# Patient Record
Sex: FEMALE | Race: WHITE | Employment: OTHER | URBAN - METROPOLITAN AREA
[De-identification: names, ages, dates, MRNs, and addresses within clinical notes are randomized per-mention and may not be internally consistent; named-entity substitution may affect disease eponyms.]

---

## 2018-05-30 ENCOUNTER — HOSPITAL ENCOUNTER (OUTPATIENT)
Dept: CT IMAGING | Age: 53
Discharge: HOME | End: 2018-05-30
Payer: COMMERCIAL

## 2018-05-30 ENCOUNTER — HOSPITAL ENCOUNTER (OUTPATIENT)
Dept: INTERVENTIONAL RADIOLOGY/VASCULAR | Age: 53
Discharge: HOME | End: 2018-05-30
Payer: COMMERCIAL

## 2018-05-30 VITALS
BODY MASS INDEX: 30.12 KG/M2 | WEIGHT: 170 LBS | HEIGHT: 63 IN | TEMPERATURE: 98 F | RESPIRATION RATE: 18 BRPM | DIASTOLIC BLOOD PRESSURE: 92 MMHG | OXYGEN SATURATION: 95 % | SYSTOLIC BLOOD PRESSURE: 130 MMHG | HEART RATE: 94 BPM

## 2018-05-30 DIAGNOSIS — M54.50 LOW BACK PAIN: ICD-10-CM

## 2018-05-30 PROCEDURE — 77030014143 HC TY PUNC LUMBR BD -A

## 2018-05-30 PROCEDURE — 72132 CT LUMBAR SPINE W/DYE: CPT

## 2018-05-30 PROCEDURE — 62304 MYELOGRAPHY LUMBAR INJECTION: CPT

## 2018-05-30 PROCEDURE — 74011636320 HC RX REV CODE- 636/320: Performed by: PHYSICIAN ASSISTANT

## 2018-05-30 PROCEDURE — 74011000250 HC RX REV CODE- 250: Performed by: PHYSICIAN ASSISTANT

## 2018-05-30 PROCEDURE — 77030003666 HC NDL SPINAL BD -A

## 2018-05-30 RX ORDER — LIDOCAINE HYDROCHLORIDE 10 MG/ML
1-20 INJECTION, SOLUTION EPIDURAL; INFILTRATION; INTRACAUDAL; PERINEURAL
Status: DISCONTINUED | OUTPATIENT
Start: 2018-05-30 | End: 2018-05-30 | Stop reason: ALTCHOICE

## 2018-05-30 RX ADMIN — IOPAMIDOL 15 ML: 408 INJECTION, SOLUTION INTRATHECAL at 08:39

## 2018-05-30 RX ADMIN — LIDOCAINE HYDROCHLORIDE 8 ML: 10 INJECTION, SOLUTION EPIDURAL; INFILTRATION; INTRACAUDAL; PERINEURAL at 08:36

## 2018-05-30 RX ADMIN — SODIUM BICARBONATE 2 ML: 0.2 INJECTION, SOLUTION INTRAVENOUS at 08:36

## 2018-05-30 NOTE — DISCHARGE INSTRUCTIONS
[address]  Department of Interventional Radiology  [phone] Office  [phone] Fax  MYELOGRAM  General Information:    Myelogram:     A Myelogram involves a lumbar puncture, and instead of removing fluid, contrast will be injected into the sac surrounding the spinal column. It is done to visualize the spinal column, nerve roots, spinal canal, vertebral discs and disc space. It is usually done to diagnose back pain with unknown cause or in preparation for surgery. After the injection, a CT scan will be done, usually within two hours of the injection. Call If:     You should call your Physician and/or the Radiology Nurse if you develop a headache that is not relieved by Tylenol, and worsens when you stand and eases when you lie down, you need to call. You may have developed what is referred to as a spinal headache. Our physician's will probably advise you to be on strict bed rest for 24 hours, to drink lots of fluids and caffeine. If this does not help the head pain, call again the next day. You should call if you have bleeding other than a small spot on your bandage. You should call if you have any numbness, tingling, weakness, fever, chills, urinary retention, severe itching, rash, welts, swelling, or confusion. Follow-Up Instructions: See the doctor who ordered your procedure as he/she has instructed. If you had a Lumbar Puncture or Myelogram, your results should be available to your ordering doctor in 3-5 business days. You can remove your dressing in 24 hours and shower regularly. Do not bathe or swim for 72 hours. To Reach Us: If you have any questions about your procedure, please call the Interventional Radiology department at [phone]. After business hours (5pm) and weekends, call the answering service at [phone] and ask for the Radiologist on call to be paged.        Interventional Radiology General Nurse Discharge    After general anesthesia or intravenous sedation, for 24 hours or while taking prescription Narcotics:  · Limit your activities  · Do not drive and operate hazardous machinery  · Do not make important personal or business decisions  · Do  not drink alcoholic beverages  · If you have not urinated within 8 hours after discharge, please contact your surgeon on call. * Please give a list of your current medications to your Primary Care Provider. * Please update this list whenever your medications are discontinued, doses are     changed, or new medications (including over-the-counter products) are added. * Please carry medication information at all times in case of emergency situations. These are general instructions for a healthy lifestyle:    No smoking/ No tobacco products/ Avoid exposure to second hand smoke  Surgeon General's Warning:  Quitting smoking now greatly reduces serious risk to your health. Obesity, smoking, and sedentary lifestyle greatly increases your risk for illness  A healthy diet, regular physical exercise & weight monitoring are important for maintaining a healthy lifestyle    You may be retaining fluid if you have a history of heart failure or if you experience any of the following symptoms:  Weight gain of 3 pounds or more overnight or 5 pounds in a week, increased swelling in our hands or feet or shortness of breath while lying flat in bed. Please call your doctor as soon as you notice any of these symptoms; do not wait until your next office visit. Recognize signs and symptoms of STROKE:  F-face looks uneven    A-arms unable to move or move unevenly    S-speech slurred or non-existent    T-time-call 911 as soon as signs and symptoms begin-DO NOT go       Back to bed or wait to see if you get better-TIME IS BRAIN. Patient Signature:  Date: 5/30/2018  Discharging Nurse:  Janet Mason RN

## 2018-05-30 NOTE — IP AVS SNAPSHOT
[address] 
 
 
 [address] 
[phone] Patient: Thor Whitmore MRN: [id number] :[id number] About your hospitalization You were admitted on:  May 30, 2018 You last received care in the:  [facility] Radiology Specials You were discharged on:  May 30, 2018 Why you were hospitalized Your primary diagnosis was:  Not on File Follow-up Information None Your Scheduled Appointments Wednesday May 30, 2018  9:30 AM EDT  
CT POST MYELO with [facility] CT 59 SLICE UNIT 1  
[facility] RADIOLOGY CT SCAN ([facility]) [address] [facility] [address] [place] [address]  
[phone]  
  
   
 2nd Part of Interventional Radiology procedure. Scan completed in CT department. Referring Physicians: 1) Fax H&P/recent office notes and order to Interventional Radiology. Lab work not typically required unless Pt condition dictates. 2)Patients with contrast dye allergies must be pre medicated prior to arrival. 3) Obtain clearance to hold blood thinners from prescribing Physician and give Patient instructions prior to arrival. 4) Continue medications and arrive well hydrated. 6) Pt to arrive 45 minutes early. This is a non sedation procedure. 7) Responsible adult  required to drive Patient home after recovery period. Recovery period can vary depending on patient condition. 8) Interventional Radiology Scheduling can be contacted at [address], [address] [place] [address]- 2nd floor of Outpatient Medical Office Building Discharge Orders None A check ashanti indicates which time of day the medication should be taken. My Medications ASK your doctor about these medications Instructions Each Dose to Equal  
 Morning Noon Evening Bedtime  
 aspirin 81 mg chewable tablet Your last dose was: Your next dose is: Take 81 mg by mouth daily.   
 81 mg  
    
   
 BENTYL 10 mg capsule Generic drug:  dicyclomine Your last dose was: Your next dose is: Take 10 mg by mouth daily as needed. 10 mg  
    
   
   
   
  
 carvedilol 3.125 mg tablet Commonly known as:  Mauricio Doshi Your last dose was: Your next dose is: Take  by mouth two (2) times daily (with meals). CHANTIX CONTINUING MONTH BONITA 1 mg tablet Generic drug:  varenicline Your last dose was: Your next dose is: Take 1 mg by mouth two (2) times daily (after meals). 1 mg CRESTOR 20 mg tablet Generic drug:  rosuvastatin Your last dose was: Your next dose is: Take 20 mg by mouth nightly. 20 mg  
    
   
   
   
  
 isosorbide mononitrate ER 60 mg CR tablet Commonly known as:  IMDUR Your last dose was: Your next dose is: Take 1 Tab by mouth every morning. 60 mg NITROSTAT 0.4 mg SL tablet Generic drug:  nitroglycerin Your last dose was: Your next dose is:    
   
   
 by SubLINGual route every five (5) minutes as needed for Chest Pain. PEPCID 40 mg tablet Generic drug:  famotidine Your last dose was: Your next dose is: Take 40 mg by mouth daily. 40 mg  
    
   
   
   
  
 PLAVIX 75 mg Tab Generic drug:  clopidogrel Your last dose was: Your next dose is: Take  by mouth daily. PROTONIX 40 mg tablet Generic drug:  pantoprazole Your last dose was: Your next dose is: Take 40 mg by mouth daily. 40 mg  
    
   
   
   
  
 RANEXA 500 mg SR tablet Generic drug:  ranolazine ER Your last dose was: Your next dose is: Take  by mouth two (2) times a day. Discharge Instructions [address] Department of Interventional Radiology 
[phone] Office 
[phone] Fax MYELOGRAM 
General Information: Myelogram: A Myelogram involves a lumbar puncture, and instead of removing fluid, contrast will be injected into the sac surrounding the spinal column. It is done to visualize the spinal column, nerve roots, spinal canal, vertebral discs and disc space. It is usually done to diagnose back pain with unknown cause or in preparation for surgery. After the injection, a CT scan will be done, usually within two hours of the injection. Call If: 
   You should call your Physician and/or the Radiology Nurse if you develop a headache that is not relieved by Tylenol, and worsens when you stand and eases when you lie down, you need to call. You may have developed what is referred to as a spinal headache. Our physician's will probably advise you to be on strict bed rest for 24 hours, to drink lots of fluids and caffeine. If this does not help the head pain, call again the next day. You should call if you have bleeding other than a small spot on your bandage. You should call if you have any numbness, tingling, weakness, fever, chills, urinary retention, severe itching, rash, welts, swelling, or confusion. Follow-Up Instructions: See the doctor who ordered your procedure as he/she has instructed. If you had a Lumbar Puncture or Myelogram, your results should be available to your ordering doctor in 3-5 business days. You can remove your dressing in 24 hours and shower regularly. Do not bathe or swim for 72 hours. To Reach Us: If you have any questions about your procedure, please call the Interventional Radiology department at [phone]. After business hours (5pm) and weekends, call the answering service at [phone] and ask for the Radiologist on call to be paged. Interventional Radiology General Nurse Discharge After general anesthesia or intravenous sedation, for 24 hours or while taking prescription Narcotics: · Limit your activities · Do not drive and operate hazardous machinery · Do not make important personal or business decisions · Do  not drink alcoholic beverages · If you have not urinated within 8 hours after discharge, please contact your surgeon on call. * Please give a list of your current medications to your Primary Care Provider. * Please update this list whenever your medications are discontinued, doses are 
   changed, or new medications (including over-the-counter products) are added. * Please carry medication information at all times in case of emergency situations. These are general instructions for a healthy lifestyle: No smoking/ No tobacco products/ Avoid exposure to second hand smoke Surgeon General's Warning:  Quitting smoking now greatly reduces serious risk to your health. Obesity, smoking, and sedentary lifestyle greatly increases your risk for illness A healthy diet, regular physical exercise & weight monitoring are important for maintaining a healthy lifestyle You may be retaining fluid if you have a history of heart failure or if you experience any of the following symptoms:  Weight gain of 3 pounds or more overnight or 5 pounds in a week, increased swelling in our hands or feet or shortness of breath while lying flat in bed. Please call your doctor as soon as you notice any of these symptoms; do not wait until your next office visit. Recognize signs and symptoms of STROKE: 
F-face looks uneven A-arms unable to move or move unevenly S-speech slurred or non-existent T-time-call 911 as soon as signs and symptoms begin-DO NOT go Back to bed or wait to see if you get better-TIME IS BRAIN. Patient Signature: 
Date: 5/30/2018 Discharging Nurse: Ivis Klein RN Introducing \[facility]\"" & HEALTH SERVICES! Mary Annarsh Wright introduces SIGFOX patient portal. Now you can access parts of your medical record, email your doctor's office, and request medication refills online. 1. In your internet browser, go to [email or web address] 2. Click on the First Time User? Click Here link in the Sign In box. You will see the New Member Sign Up page. 3. Enter your SIGFOX Access Code exactly as it appears below. You will not need to use this code after youve completed the sign-up process. If you do not sign up before the expiration date, you must request a new code. · SIG[employer]X Access Code: [email or web address] Expires: 8/27/2018  9:50 AM 
 
4. Enter the last four digits of your Social Security Number (xxxx) and Date of Birth (mm/dd/yyyy) as indicated and click Submit. You will be taken to the next sign-up page. 5. Create a SIGFOX ID. This will be your SIGFOX login ID and cannot be changed, so think of one that is secure and easy to remember. 6. Create a SIG[employer]X password. You can change your password at any time. 7. Enter your Password Reset Question and Answer. This can be used at a later time if you forget your password. 8. Enter your e-mail address. You will receive e-mail notification when new information is available in [address] 9. Click Sign Up. You can now view and download portions of your medical record. 10. Click the Download Summary menu link to download a portable copy of your medical information. If you have questions, please visit the Frequently Asked Questions section of the SIG[employer]X website. Remember, SIGFOX is NOT to be used for urgent needs. For medical emergencies, dial 911. Now available from your iPhone and Android! Introducing Fercho Rushing As a [facility] James[facility] patient, I wanted to make you aware of our electronic visit tool called Fercho Rushing. Mary Ann Wright 24/7 allows you to connect within minutes with a medical provider 24 hours a day, seven days a week via a mobile device or tablet or logging into a secure website from your computer. You can access [employer] from anywhere in the [place]. A virtual visit might be right for you when you have a simple condition and feel like you just dont want to get out of bed, or cant get away from work for an appointment, when your regular Tova Valderrama provider is not available (evenings, weekends or holidays), or when youre out of town and need minor care. Electronic visits cost only $49 and if the Tova Valderrama 24/7 provider determines a prescription is needed to treat your condition, one can be electronically transmitted to a nearby pharmacy*. Please take a moment to enroll today if you have not already done so. The enrollment process is free and takes just a few minutes. To enroll, please download the magnify360 24/7 dominga to your tablet or phone, or visit [email or web address] to enroll on your computer. And, as an [address] [place] [address] patient with a [employer] account, the results of your visits will be scanned into your electronic medical record and your primary care provider will be able to view the scanned results. We urge you to continue to see your regular Tova Lavelle provider for your ongoing medical care. And while your primary care provider may not be the one available when you seek a Fercho Gilmanramonfin virtual visit, the peace of mind you get from getting a real diagnosis real time can be priceless. For more information on Fercho IROCKEramonfin, view our Frequently Asked Questions (FAQs) at [email or web address]. Sincerely, 
 
Hannah Browne MD 
Chief Medical Officer [place] *:  certain medications cannot be prescribed via [employer] Unresulted Labs-Please follow up with your PCP about these lab tests Order Current Status IR MYELOGRAM LUMBAR In process Your Primary Care Physician (PCP) Primary Care Physician Office Phone Office Fax Araceli Prior [phone] You are allergic to the following Allergen Reactions Latex Rash Skin peels off Artichoke Anaphylaxis Bactrim (Sulfamethoprim) Anaphylaxis Keflex (Cephalexin) Anaphylaxis Pcn (Penicillins) Anaphylaxis Amoxicillin Other (comments) Doxycycline Rash Skin peels off Morphine Swelling Tetracycline Vertigo Recent Documentation Height Weight Breastfeeding? BMI Smoking Status 1.6 m 77.1 kg No 30.11 kg/m2 Former Smoker Emergency Contacts Name Discharge Info Relation Home Work Mobile [phone] [facility] [7] [phone] Patient Belongings The following personal items are in your possession at time of discharge: 
     Visual Aid: None Please provide this summary of care documentation to your next provider. Signatures-by signing, you are acknowledging that this After Visit Summary has been reviewed with you and you have received a copy. Patient Signature:  ____________________________________________________________ Date:  ____________________________________________________________  
  
Noreen Lopez Provider Signature:  ____________________________________________________________ Date:  ____________________________________________________________

## 2018-05-30 NOTE — PROGRESS NOTES
TRANSFER - OUT REPORT:    Verbal report given to [facility], RN (name) on Pennie  being transferred to IR recovery (unit) for routine progression of care       Report consisted of patients Situation, Background, Assessment and   Recommendations(SBAR). Information from the following report(s) Procedure Summary and MAR was reviewed with the receiving nurse. Lines:       Opportunity for questions and clarification was provided. Patient transported with:   Registered Nurse     CT notified to scan patient in 30 minutes.

## 2018-05-30 NOTE — PROGRESS NOTES
Discharge complete. Discharge instructions reviewed with pt. Time for questions allowed. Pt denies any questions at this time. Dressing to back noted to be clean, dry, and intact. Pt assisted to front of hospital via wheelchair.      Visit Vitals    BP (!) 130/92 (BP 1 Location: Right arm, BP Patient Position: At rest)    Pulse 94    Temp 98 °F (36.7 °C)    Resp 18    Ht 5' 3\" (1.6 m)    Wt 77.1 kg (170 lb)    SpO2 95%    Breastfeeding No    BMI 30.11 kg/m2